# Patient Record
Sex: MALE | Race: WHITE | Employment: FULL TIME | URBAN - METROPOLITAN AREA
[De-identification: names, ages, dates, MRNs, and addresses within clinical notes are randomized per-mention and may not be internally consistent; named-entity substitution may affect disease eponyms.]

---

## 2022-10-04 ENCOUNTER — OFFICE VISIT (OUTPATIENT)
Dept: UROLOGY | Age: 47
End: 2022-10-04
Payer: COMMERCIAL

## 2022-10-04 VITALS
SYSTOLIC BLOOD PRESSURE: 130 MMHG | HEART RATE: 85 BPM | DIASTOLIC BLOOD PRESSURE: 72 MMHG | HEIGHT: 74 IN | WEIGHT: 170 LBS | OXYGEN SATURATION: 98 % | BODY MASS INDEX: 21.82 KG/M2

## 2022-10-04 DIAGNOSIS — N50.812 PAIN IN BOTH TESTICLES: ICD-10-CM

## 2022-10-04 DIAGNOSIS — N50.811 PAIN IN BOTH TESTICLES: ICD-10-CM

## 2022-10-04 DIAGNOSIS — N50.89 SWOLLEN TESTICLE: Primary | ICD-10-CM

## 2022-10-04 LAB
BILIRUBIN, POC: NORMAL
BLOOD URINE, POC: NORMAL
CLARITY, POC: CLEAR
COLOR, POC: YELLOW
GLUCOSE URINE, POC: NORMAL
KETONES, POC: NORMAL
LEUKOCYTE EST, POC: NORMAL
NITRITE, POC: NORMAL
PH, POC: 5.5
PROTEIN, POC: NORMAL
SPECIFIC GRAVITY, POC: 1.02
UROBILINOGEN, POC: 0.2

## 2022-10-04 PROCEDURE — 99204 OFFICE O/P NEW MOD 45 MIN: CPT | Performed by: UROLOGY

## 2022-10-04 PROCEDURE — G8427 DOCREV CUR MEDS BY ELIG CLIN: HCPCS | Performed by: UROLOGY

## 2022-10-04 PROCEDURE — G8420 CALC BMI NORM PARAMETERS: HCPCS | Performed by: UROLOGY

## 2022-10-04 PROCEDURE — 81003 URINALYSIS AUTO W/O SCOPE: CPT | Performed by: UROLOGY

## 2022-10-04 PROCEDURE — 1036F TOBACCO NON-USER: CPT | Performed by: UROLOGY

## 2022-10-04 PROCEDURE — G8484 FLU IMMUNIZE NO ADMIN: HCPCS | Performed by: UROLOGY

## 2022-10-04 RX ORDER — SILDENAFIL 100 MG/1
100 TABLET, FILM COATED ORAL DAILY PRN
Qty: 30 TABLET | Refills: 6 | Status: SHIPPED | OUTPATIENT
Start: 2022-10-04

## 2022-10-04 NOTE — PROGRESS NOTES
[facility] EVALUATION NOTE                                                 H&P          Note:  Assessment and plan  Left testalgia  Findings consistent with ruptured left epididymal cyst with inflammation  Patient has had a vasectomy  Patient also experiencing erectile dysfunction  Had a physical 1 year ago labs were normal  Recommend patient continue to follow-up with his primary care physician  Denies history of chest pain or other issues  No history of elevated cholesterol      The note below is complete evaluation of patient on follow-up/consultation                                                                                                                                                 Reason for Visit  Testalgia  ED    History of Present Illness  70-year-old male with 2-week history of left testicular discomfort  Findings consistent with left epididymal cyst with rupture and reactive inflammation  Normal prostate exam  Normal testis exam  Minimal obstructive voiding symptoms      Urologic Review of Systems/Symptoms  Minimal obstructive voiding symptoms  ED    Review of Systems  Hospitalization: None recent  All 14 categories of Review of Systems otherwise reviewed no other findings reported. Does not get regular follow-ups  History reviewed. No pertinent past medical history. Past Surgical History:   Procedure Laterality Date    VASECTOMY      Dr. Melani Duarte     Social History     Socioeconomic History    Marital status:      Spouse name: None    Number of children: None    Years of education: None    Highest education level: None   Tobacco Use    Smoking status: Former     Types: Cigarettes     Quit date:      Years since quittin.7    Smokeless tobacco: Never     History reviewed. No pertinent family history.   Current Outpatient Medications   Medication Sig Dispense Refill    sildenafil (VIAGRA) 100 MG tablet Take 1 tablet by mouth daily as needed for Erectile Dysfunction 30 tablet 6     No current facility-administered medications for this visit. Patient has no known allergies. All reviewed and verified by Dr Carmela Gibbons on today's visit    No results found for: PSA, PSADIA  Results for POC orders placed in visit on 10/04/22   POCT Urinalysis No Micro (Auto)   Result Value Ref Range    Color, UA yellow     Clarity, UA clear     Glucose, UA POC neg     Bilirubin, UA neg     Ketones, UA neg     Spec Grav, UA 1.020     Blood, UA POC neg     pH, UA 5.5     Protein, UA POC neg     Urobilinogen, UA 0.2     Leukocytes, UA neg     Nitrite, UA neg        Physical Exam  Vitals:    10/04/22 0825   BP: 130/72   Pulse: 85   SpO2: 98%   Weight: 170 lb (77.1 kg)   Height: 6' 2\" (1.88 m)     Constitutional: Not in distress. Cardiovascular: Normal rate, BP reviewed. Blood pressure normal  Pulmonary/Chest: Normal respiratory effort not short of breath  Abdominal: Not distended. No evidence of hernias  Urologic Exam  Circumcised penis normal meatus  Right testicle within normal limits patient has an epididymal cyst on the right secondary to vasectomy  Left testicle within normal limits tender cyst on the left consistent with epididymal cyst  Normal rectal tone  Small prostate with no nodules  Additional findings  Unremarkable  Remainder the physical exam is normal  Assessment/Medical Necessity-Decision Making  Bilateral epididymal cyst secondary to vasectomy with probable ruptured left epididymal cyst causing inflammation  Anti-inflammatories  Scrotal support  Self exams  Sonogram of the testis  Erectile dysfunction  Prescription for Viagra given the description and on use  Plan  Continue self exams  Patient will be called with results of his ultrasound  Greater than 50% of 45 minutes spent consulting patient face-to-face  Orders Placed This Encounter   Procedures    [address]     This procedure can be scheduled via [facility].   Access your [facility] account by visiting [email or web address]. Standing Status:   Future     Standing Expiration Date:   10/4/2023    POCT Urinalysis No Micro (Auto)     Orders Placed This Encounter   Medications    sildenafil (VIAGRA) 100 MG tablet     Sig: Take 1 tablet by mouth daily as needed for Erectile Dysfunction     Dispense:  30 tablet     Refill:  6     Susu Miranda MD       Please note this report has been partially produced using speech recognition software  And may cause contain errors related to that system including grammar, punctuation and spelling as well as words and phrases that may seem inappropriate. If there are questions or concerns please feel free to contact me to clarify.

## 2022-10-11 ENCOUNTER — HOSPITAL ENCOUNTER (OUTPATIENT)
Dept: ULTRASOUND IMAGING | Age: 47
Discharge: HOME | End: 2022-10-13
Payer: COMMERCIAL

## 2022-10-11 DIAGNOSIS — N50.812 PAIN IN BOTH TESTICLES: ICD-10-CM

## 2022-10-11 DIAGNOSIS — N50.811 PAIN IN BOTH TESTICLES: ICD-10-CM

## 2022-10-11 PROCEDURE — 76870 US EXAM SCROTUM: CPT
